# Patient Record
Sex: FEMALE | URBAN - METROPOLITAN AREA
[De-identification: names, ages, dates, MRNs, and addresses within clinical notes are randomized per-mention and may not be internally consistent; named-entity substitution may affect disease eponyms.]

---

## 2022-02-17 ENCOUNTER — OFFICE VISIT (OUTPATIENT)
Dept: URBAN - METROPOLITAN AREA CLINIC 1 | Facility: CLINIC | Age: 82
End: 2022-02-17
Payer: MEDICARE

## 2022-02-17 DIAGNOSIS — H25.13 AGE-RELATED NUCLEAR CATARACT, BILATERAL: Primary | ICD-10-CM

## 2022-02-17 DIAGNOSIS — H04.123 TEAR FILM INSUFFICIENCY OF BILATERAL LACRIMAL GLANDS: ICD-10-CM

## 2022-02-17 PROCEDURE — 92134 CPTRZ OPH DX IMG PST SGM RTA: CPT | Performed by: OPHTHALMOLOGY

## 2022-02-17 PROCEDURE — 99214 OFFICE O/P EST MOD 30 MIN: CPT | Performed by: OPHTHALMOLOGY

## 2022-02-17 ASSESSMENT — INTRAOCULAR PRESSURE
OD: 15
OS: 16

## 2022-02-17 NOTE — IMPRESSION/PLAN
Impression: Age-related nuclear cataract, bilateral: H25.13. Plan: Cataracts account for the patient's complaints. Discussed all risks, benefits, procedures and recovery. Patient understands changing glasses will not improve vision. Patient desires to have surgery, recommend phacoemulsification with intraocular lens. Today we will proceed for cataract surgery. Per pt will wait for sx until summer due to daughters work schedule. Surgery coordinator will talk to her today.

## 2022-02-24 ENCOUNTER — APPOINTMENT (RX ONLY)
Dept: URBAN - METROPOLITAN AREA CLINIC 132 | Facility: CLINIC | Age: 82
Setting detail: DERMATOLOGY
End: 2022-02-24

## 2022-02-24 DIAGNOSIS — L82.1 OTHER SEBORRHEIC KERATOSIS: ICD-10-CM

## 2022-02-24 DIAGNOSIS — L91.8 OTHER HYPERTROPHIC DISORDERS OF THE SKIN: ICD-10-CM

## 2022-02-24 DIAGNOSIS — D22 MELANOCYTIC NEVI: ICD-10-CM

## 2022-02-24 DIAGNOSIS — L30.4 ERYTHEMA INTERTRIGO: ICD-10-CM

## 2022-02-24 DIAGNOSIS — B07.8 OTHER VIRAL WARTS: ICD-10-CM

## 2022-02-24 PROBLEM — D48.5 NEOPLASM OF UNCERTAIN BEHAVIOR OF SKIN: Status: ACTIVE | Noted: 2022-02-24

## 2022-02-24 PROCEDURE — 99202 OFFICE O/P NEW SF 15 MIN: CPT | Mod: 25

## 2022-02-24 PROCEDURE — ? BIOPSY BY SHAVE METHOD

## 2022-02-24 PROCEDURE — 11102 TANGNTL BX SKIN SINGLE LES: CPT

## 2022-02-24 PROCEDURE — ? DEFER

## 2022-02-24 PROCEDURE — ? COUNSELING

## 2022-02-24 ASSESSMENT — LOCATION ZONE DERM
LOCATION ZONE: FACE
LOCATION ZONE: ARM
LOCATION ZONE: TRUNK
LOCATION ZONE: LEG
LOCATION ZONE: NECK

## 2022-02-24 ASSESSMENT — LOCATION SIMPLE DESCRIPTION DERM
LOCATION SIMPLE: RIGHT THIGH
LOCATION SIMPLE: LEFT FOREARM
LOCATION SIMPLE: LEFT ANTERIOR NECK
LOCATION SIMPLE: LEFT THIGH
LOCATION SIMPLE: ABDOMEN
LOCATION SIMPLE: LEFT FOREHEAD
LOCATION SIMPLE: RIGHT ANTERIOR NECK

## 2022-02-24 ASSESSMENT — LOCATION DETAILED DESCRIPTION DERM
LOCATION DETAILED: LEFT DISTAL RADIAL DORSAL FOREARM
LOCATION DETAILED: RIGHT ANTERIOR PROXIMAL THIGH
LOCATION DETAILED: LEFT INFERIOR LATERAL NECK
LOCATION DETAILED: LEFT FOREHEAD
LOCATION DETAILED: RIGHT INFERIOR LATERAL NECK
LOCATION DETAILED: LEFT CLAVICULAR NECK
LOCATION DETAILED: LEFT ANTERIOR PROXIMAL THIGH
LOCATION DETAILED: LEFT LATERAL ABDOMEN
LOCATION DETAILED: RIGHT LATERAL ABDOMEN

## 2022-02-24 NOTE — PROCEDURE: DEFER
Detail Level: Detailed
Introduction Text (Please End With A Colon): The following procedure was deferred: excision (plan for 6mm punch excision vs formal excision)

## 2022-02-24 NOTE — PROCEDURE: BIOPSY BY SHAVE METHOD
Biopsy Type: H and E
Size Of Lesion In Cm: 0
Hemostasis: Drysol
Billing Type: Third-Party Bill
Render Post-Care Instructions In Note?: no
Electrodesiccation Text: The wound bed was treated with electrodesiccation after the biopsy was performed.
Type Of Destruction Used: Curettage
Silver Nitrate Text: The wound bed was treated with silver nitrate after the biopsy was performed.
Wound Care: Petrolatum
Anesthesia Type: 1% lidocaine with epinephrine
Dressing: bandage
Biopsy Method: Dermablade
Post-Care Instructions: I reviewed with the patient in detail post-care instructions. Patient is to keep the biopsy site dry overnight, and then apply bacitracin twice daily until healed. Patient may apply hydrogen peroxide soaks to remove any crusting.
Detail Level: Detailed
Consent: Written consent was obtained and risks were reviewed including but not limited to scarring, infection, bleeding, scabbing, incomplete removal, nerve damage and allergy to anesthesia.
Curettage Text: The wound bed was treated with curettage after the biopsy was performed.
Anesthesia Volume In Cc: 0.5
Depth Of Biopsy: dermis
Information: Selecting Yes will display possible errors in your note based on the variables you have selected. This validation is only offered as a suggestion for you. PLEASE NOTE THAT THE VALIDATION TEXT WILL BE REMOVED WHEN YOU FINALIZE YOUR NOTE. IF YOU WANT TO FAX A PRELIMINARY NOTE YOU WILL NEED TO TOGGLE THIS TO 'NO' IF YOU DO NOT WANT IT IN YOUR FAXED NOTE.
Electrodesiccation And Curettage Text: The wound bed was treated with electrodesiccation and curettage after the biopsy was performed.
Was A Bandage Applied: Yes
Notification Instructions: Patient will be notified of biopsy results. However, patient instructed to call the office if not contacted within 2 weeks.
Cryotherapy Text: The wound bed was treated with cryotherapy after the biopsy was performed.

## 2022-09-20 ENCOUNTER — OFFICE VISIT (OUTPATIENT)
Dept: URBAN - METROPOLITAN AREA CLINIC 1 | Facility: CLINIC | Age: 82
End: 2022-09-20
Payer: MEDICARE

## 2022-09-20 DIAGNOSIS — H04.123 TEAR FILM INSUFFICIENCY OF BILATERAL LACRIMAL GLANDS: Primary | ICD-10-CM

## 2022-09-20 DIAGNOSIS — H25.13 AGE-RELATED NUCLEAR CATARACT, BILATERAL: ICD-10-CM

## 2022-09-20 PROCEDURE — 99213 OFFICE O/P EST LOW 20 MIN: CPT | Performed by: OPHTHALMOLOGY

## 2022-09-20 ASSESSMENT — INTRAOCULAR PRESSURE
OD: 13
OS: 13

## 2022-09-20 NOTE — IMPRESSION/PLAN
Impression: Age-related nuclear cataract, bilateral: H25.13. Plan: Discussed diagnosis in detail with patient.   Patient will be doing a thyroid wkup so we will hold off on surgery for now 

rv 6 mos ck cat

## 2022-09-20 NOTE — IMPRESSION/PLAN
Impression: Tear film insufficiency of bilateral lacrimal glands: H04.123.  Plan: Recommend use of high quality artificial tears 3-4x per day prn.

pt undergoing thyroid wk up

## 2023-03-23 ENCOUNTER — OFFICE VISIT (OUTPATIENT)
Dept: URBAN - METROPOLITAN AREA CLINIC 1 | Facility: CLINIC | Age: 83
End: 2023-03-23
Payer: MEDICARE

## 2023-03-23 DIAGNOSIS — H25.13 AGE-RELATED NUCLEAR CATARACT, BILATERAL: Primary | ICD-10-CM

## 2023-03-23 DIAGNOSIS — H04.123 TEAR FILM INSUFFICIENCY OF BILATERAL LACRIMAL GLANDS: ICD-10-CM

## 2023-03-23 PROCEDURE — 99213 OFFICE O/P EST LOW 20 MIN: CPT | Performed by: OPHTHALMOLOGY

## 2023-03-23 PROCEDURE — 92134 CPTRZ OPH DX IMG PST SGM RTA: CPT | Performed by: OPHTHALMOLOGY

## 2023-03-23 RX ORDER — PREDNISOLONE ACETATE 10 MG/ML
1 % SUSPENSION/ DROPS OPHTHALMIC
Qty: 5 | Refills: 1 | Status: ACTIVE
Start: 2023-03-23

## 2023-03-23 ASSESSMENT — INTRAOCULAR PRESSURE
OD: 13
OS: 13

## 2023-03-23 NOTE — IMPRESSION/PLAN
Impression: Tear film insufficiency of bilateral lacrimal glands: H04.123. Plan: Dry eyes account for the patient's complaints. There is no evidence of permanent changes to the cornea. Explained that this condition does not have a cure. The options explained to the patient include topical lubricants. Patient advised not to use eye drops for red eye or allergies. Start: 1. Artificial tears QID OU 2. Prednisolone Taper OU for 1 month 3. Warm compresses and lid massages BID OU Instructions given.

## 2023-03-23 NOTE — IMPRESSION/PLAN
Impression: Age-related nuclear cataract, bilateral: H25.13. Plan: Observe for now without intervention. The patient was advised to contact us if any change or worsening of vision. We will work on patient's dryness first before getting testing for cataracts.

## 2023-03-31 ENCOUNTER — TESTING ONLY (OUTPATIENT)
Dept: URBAN - METROPOLITAN AREA CLINIC 1 | Facility: CLINIC | Age: 83
End: 2023-03-31
Payer: MEDICARE

## 2023-03-31 DIAGNOSIS — H25.13 AGE-RELATED NUCLEAR CATARACT, BILATERAL: Primary | ICD-10-CM

## 2023-03-31 PROCEDURE — 92286 ANT SGM IMG I&R SPECLR MIC: CPT | Performed by: OPHTHALMOLOGY

## 2023-04-20 ENCOUNTER — OFFICE VISIT (OUTPATIENT)
Dept: URBAN - METROPOLITAN AREA CLINIC 1 | Facility: CLINIC | Age: 83
End: 2023-04-20
Payer: MEDICARE

## 2023-04-20 DIAGNOSIS — H25.13 AGE-RELATED NUCLEAR CATARACT, BILATERAL: Primary | ICD-10-CM

## 2023-04-20 DIAGNOSIS — H04.123 TEAR FILM INSUFFICIENCY OF BILATERAL LACRIMAL GLANDS: ICD-10-CM

## 2023-04-20 PROCEDURE — 99212 OFFICE O/P EST SF 10 MIN: CPT | Performed by: OPHTHALMOLOGY

## 2023-04-20 ASSESSMENT — INTRAOCULAR PRESSURE
OD: 15
OS: 16

## 2023-04-20 NOTE — IMPRESSION/PLAN
Impression: Tear film insufficiency of bilateral lacrimal glands: H04.123. Plan: Dry eyes account for the patient's complaints. There is no evidence of permanent changes to the cornea. Explained that this condition does not have a cure. The options explained to the patient include topical lubricants. Patient advised not to use eye drops for red eye or allergies. Patient to use OTC AT's tid to qid ou. Patient advised to use warm compresses and lid massages BID OU.

## 2023-04-20 NOTE — IMPRESSION/PLAN
Impression: Age-related nuclear cataract, bilateral: H25.13. Plan: Observe for now without intervention. The patient was advised to contact us if any change or worsening of vision. Patient would like to have surgery in June/July. She will sit down with Ata Bauer today to go over surgery dates.

## 2023-06-13 ENCOUNTER — OFFICE VISIT (OUTPATIENT)
Dept: URBAN - METROPOLITAN AREA CLINIC 1 | Facility: CLINIC | Age: 83
End: 2023-06-13
Payer: MEDICARE

## 2023-06-13 DIAGNOSIS — H04.123 TEAR FILM INSUFFICIENCY OF BILATERAL LACRIMAL GLANDS: ICD-10-CM

## 2023-06-13 DIAGNOSIS — H25.13 AGE-RELATED NUCLEAR CATARACT, BILATERAL: Primary | ICD-10-CM

## 2023-06-13 PROCEDURE — 99213 OFFICE O/P EST LOW 20 MIN: CPT | Performed by: OPHTHALMOLOGY

## 2023-06-13 ASSESSMENT — INTRAOCULAR PRESSURE
OS: 16
OD: 17

## 2023-06-13 NOTE — IMPRESSION/PLAN
Impression: Tear film insufficiency of bilateral lacrimal glands: H04.123. Plan: Dry eyes account for the patient's complaints. There is no evidence of permanent changes to the cornea. Explained that this condition does not have a cure. The options explained to the patient include topical lubricants. Patient advised not to use eye drops for red eye or allergies. Patient to use OTC AT's tid to qid ou. We will have patient start Lotemax TID OD 1 week before surgery due to dryness.

## 2023-06-13 NOTE — IMPRESSION/PLAN
Impression: Age-related nuclear cataract, bilateral: H25.13. Plan: Plan to perform cataract surgery in the right / left eye: Discussed the risks, benefits, and alternatives of cataract surgery. The patient stated a full understanding and a desire to proceed with the procedure. Biometry ordered for intraocular lens selection. Advised against driving until complete. Reviewed the increased risk of retinal detachment after cataract surgery and reviewed retinal detachment and general warnings and to contact us immediately should any of those develop. 

CHEST/LUNGS CLEARED ON TODAYS VISIT

DR Song Kaplan WENT OVER SPECIALTY LENS,  PATIENT UNDERSTOOD

OD 1ST EYE; CNWET0 +20.5; VIVITY TORIC  UPGRADE; SURGERY DROPS

## 2024-09-04 ENCOUNTER — APPOINTMENT (RX ONLY)
Dept: URBAN - METROPOLITAN AREA CLINIC 132 | Facility: CLINIC | Age: 84
Setting detail: DERMATOLOGY
End: 2024-09-04

## 2024-09-04 DIAGNOSIS — L29.89 OTHER PRURITUS: ICD-10-CM

## 2024-09-04 PROBLEM — L29.8 OTHER PRURITUS: Status: ACTIVE | Noted: 2024-09-04

## 2024-09-04 PROCEDURE — ? COUNSELING

## 2024-09-04 PROCEDURE — ? ORDER TESTS

## 2024-09-04 PROCEDURE — ? PRESCRIPTION

## 2024-09-04 PROCEDURE — 99214 OFFICE O/P EST MOD 30 MIN: CPT

## 2024-09-04 RX ORDER — HYDROXYZINE PAMOATE 25 MG/1
CAPSULE ORAL
Qty: 30 | Refills: 0 | Status: ERX | COMMUNITY
Start: 2024-09-04

## 2024-09-04 RX ADMIN — HYDROXYZINE PAMOATE: 25 CAPSULE ORAL at 00:00

## 2024-09-04 ASSESSMENT — LOCATION DETAILED DESCRIPTION DERM
LOCATION DETAILED: RIGHT PROXIMAL PRETIBIAL REGION
LOCATION DETAILED: SUPERIOR THORACIC SPINE
LOCATION DETAILED: RIGHT ANTERIOR PROXIMAL THIGH
LOCATION DETAILED: LEFT ANTERIOR PROXIMAL THIGH
LOCATION DETAILED: RIGHT BUTTOCK
LOCATION DETAILED: LEFT DISTAL PRETIBIAL REGION
LOCATION DETAILED: RIGHT PROXIMAL LATERAL POSTERIOR THIGH
LOCATION DETAILED: LEFT INGUINAL CREASE
LOCATION DETAILED: PERIUMBILICAL SKIN
LOCATION DETAILED: LEFT BUTTOCK
LOCATION DETAILED: RIGHT INFERIOR MEDIAL UPPER BACK

## 2024-09-04 ASSESSMENT — LOCATION ZONE DERM
LOCATION ZONE: LEG
LOCATION ZONE: TRUNK

## 2024-09-04 ASSESSMENT — LOCATION SIMPLE DESCRIPTION DERM
LOCATION SIMPLE: LEFT BUTTOCK
LOCATION SIMPLE: ABDOMEN
LOCATION SIMPLE: GROIN
LOCATION SIMPLE: UPPER BACK
LOCATION SIMPLE: RIGHT POSTERIOR THIGH
LOCATION SIMPLE: RIGHT BUTTOCK
LOCATION SIMPLE: RIGHT UPPER BACK
LOCATION SIMPLE: LEFT PRETIBIAL REGION
LOCATION SIMPLE: RIGHT PRETIBIAL REGION
LOCATION SIMPLE: RIGHT THIGH
LOCATION SIMPLE: LEFT THIGH

## 2024-09-04 NOTE — PROCEDURE: ORDER TESTS
Billing Type: Third-Party Bill
Expected Date Of Service: 09/04/2024
Bill For Surgical Tray: no
Performing Laboratory: [id number]

## 2024-09-18 ENCOUNTER — APPOINTMENT (RX ONLY)
Dept: URBAN - METROPOLITAN AREA CLINIC 132 | Facility: CLINIC | Age: 84
Setting detail: DERMATOLOGY
End: 2024-09-18

## 2024-09-18 DIAGNOSIS — L85.3 XEROSIS CUTIS: ICD-10-CM

## 2024-09-18 DIAGNOSIS — R20.2 PARESTHESIA OF SKIN: ICD-10-CM

## 2024-09-18 DIAGNOSIS — L29.89 OTHER PRURITUS: ICD-10-CM | Status: IMPROVED

## 2024-09-18 PROBLEM — L29.8 OTHER PRURITUS: Status: ACTIVE | Noted: 2024-09-18

## 2024-09-18 PROCEDURE — ? PRESCRIPTION

## 2024-09-18 PROCEDURE — ? COUNSELING

## 2024-09-18 PROCEDURE — 99214 OFFICE O/P EST MOD 30 MIN: CPT

## 2024-09-18 PROCEDURE — ? LAB REPORTS REVIEWED

## 2024-09-18 RX ORDER — TRIAMCINOLONE ACETONIDE 1 MG/G
CREAM TOPICAL BID
Qty: 80 | Refills: 3 | Status: ERX | COMMUNITY
Start: 2024-09-18

## 2024-09-18 RX ADMIN — TRIAMCINOLONE ACETONIDE: 1 CREAM TOPICAL at 00:00

## 2024-09-18 ASSESSMENT — LOCATION SIMPLE DESCRIPTION DERM
LOCATION SIMPLE: UPPER BACK
LOCATION SIMPLE: GROIN
LOCATION SIMPLE: RIGHT POSTERIOR THIGH
LOCATION SIMPLE: RIGHT THIGH
LOCATION SIMPLE: RIGHT PRETIBIAL REGION
LOCATION SIMPLE: RIGHT UPPER BACK
LOCATION SIMPLE: LEFT THIGH
LOCATION SIMPLE: LEFT PRETIBIAL REGION
LOCATION SIMPLE: RIGHT BUTTOCK
LOCATION SIMPLE: SCALP
LOCATION SIMPLE: LEFT FOREARM
LOCATION SIMPLE: LEFT BUTTOCK
LOCATION SIMPLE: RIGHT FOREARM
LOCATION SIMPLE: ABDOMEN

## 2024-09-18 ASSESSMENT — LOCATION DETAILED DESCRIPTION DERM
LOCATION DETAILED: RIGHT ANTERIOR PROXIMAL THIGH
LOCATION DETAILED: RIGHT INFERIOR MEDIAL UPPER BACK
LOCATION DETAILED: LEFT INGUINAL CREASE
LOCATION DETAILED: LEFT BUTTOCK
LOCATION DETAILED: LEFT SUPERIOR PARIETAL SCALP
LOCATION DETAILED: SUPERIOR THORACIC SPINE
LOCATION DETAILED: RIGHT PROXIMAL LATERAL POSTERIOR THIGH
LOCATION DETAILED: RIGHT PROXIMAL PRETIBIAL REGION
LOCATION DETAILED: PERIUMBILICAL SKIN
LOCATION DETAILED: LEFT DISTAL PRETIBIAL REGION
LOCATION DETAILED: RIGHT PROXIMAL DORSAL FOREARM
LOCATION DETAILED: RIGHT MEDIAL UPPER BACK
LOCATION DETAILED: LEFT ANTERIOR PROXIMAL THIGH
LOCATION DETAILED: RIGHT BUTTOCK
LOCATION DETAILED: LEFT DISTAL DORSAL FOREARM

## 2024-09-18 ASSESSMENT — LOCATION ZONE DERM
LOCATION ZONE: SCALP
LOCATION ZONE: LEG
LOCATION ZONE: ARM
LOCATION ZONE: TRUNK

## 2024-09-18 NOTE — HPI: FOLLOW-UP
What Is The Condition That You Are Returning For Follow-Up?: other
Additional History: On last visit was prescribed hydroxyzine which she discontinued due to making her too sleepy

## 2024-10-01 ENCOUNTER — OFFICE VISIT (OUTPATIENT)
Dept: URBAN - METROPOLITAN AREA CLINIC 1 | Facility: CLINIC | Age: 84
End: 2024-10-01
Payer: MEDICARE

## 2024-10-01 DIAGNOSIS — H04.123 TEAR FILM INSUFFICIENCY OF BILATERAL LACRIMAL GLANDS: ICD-10-CM

## 2024-10-01 PROCEDURE — 92134 CPTRZ OPH DX IMG PST SGM RTA: CPT | Performed by: OPHTHALMOLOGY

## 2024-10-01 PROCEDURE — 99214 OFFICE O/P EST MOD 30 MIN: CPT | Performed by: OPHTHALMOLOGY

## 2024-10-01 ASSESSMENT — INTRAOCULAR PRESSURE
OD: 15
OS: 15

## 2024-10-04 ENCOUNTER — TECH ONLY (OUTPATIENT)
Dept: URBAN - METROPOLITAN AREA CLINIC 1 | Facility: CLINIC | Age: 84
End: 2024-10-04
Payer: MEDICARE

## 2024-10-04 DIAGNOSIS — H25.13 AGE-RELATED NUCLEAR CATARACT, BILATERAL: Primary | ICD-10-CM

## 2024-10-04 PROCEDURE — 92025 CPTRIZED CORNEAL TOPOGRAPHY: CPT | Performed by: OPHTHALMOLOGY

## 2024-10-14 ENCOUNTER — OFFICE VISIT (OUTPATIENT)
Dept: URBAN - METROPOLITAN AREA CLINIC 1 | Facility: CLINIC | Age: 84
End: 2024-10-14
Payer: MEDICARE

## 2024-10-14 DIAGNOSIS — H25.13 AGE-RELATED NUCLEAR CATARACT, BILATERAL: Primary | ICD-10-CM

## 2024-10-14 PROCEDURE — 99214 OFFICE O/P EST MOD 30 MIN: CPT | Performed by: OPHTHALMOLOGY

## 2024-10-14 RX ORDER — CIPROFLOXACIN HYDROCHLORIDE 3 MG/ML
0.3 % SOLUTION OPHTHALMIC
Qty: 5 | Refills: 1 | Status: INACTIVE
Start: 2024-10-14 | End: 2024-10-15

## 2024-10-14 RX ORDER — PREDNISOLONE ACETATE 10 MG/ML
1 % SUSPENSION/ DROPS OPHTHALMIC
Qty: 10 | Refills: 1 | Status: ACTIVE
Start: 2024-10-14

## 2024-10-14 ASSESSMENT — INTRAOCULAR PRESSURE
OS: 18
OD: 17

## 2024-11-06 ENCOUNTER — APPOINTMENT (RX ONLY)
Dept: URBAN - METROPOLITAN AREA CLINIC 132 | Facility: CLINIC | Age: 84
Setting detail: DERMATOLOGY
End: 2024-11-06

## 2024-11-06 DIAGNOSIS — L29.89 OTHER PRURITUS: ICD-10-CM

## 2024-11-06 DIAGNOSIS — R20.2 PARESTHESIA OF SKIN: ICD-10-CM

## 2024-11-06 PROCEDURE — ? COUNSELING

## 2024-11-06 PROCEDURE — ? TREATMENT REGIMEN

## 2024-11-06 PROCEDURE — 99213 OFFICE O/P EST LOW 20 MIN: CPT

## 2024-11-06 PROCEDURE — ? PRESCRIPTION

## 2024-11-06 RX ORDER — CLOBETASOL PROPIONATE 0.5 MG/ML
SOLUTION TOPICAL
Qty: 50 | Refills: 5 | Status: ERX | COMMUNITY
Start: 2024-11-06

## 2024-11-06 RX ADMIN — CLOBETASOL PROPIONATE: 0.5 SOLUTION TOPICAL at 00:00

## 2024-11-06 ASSESSMENT — LOCATION SIMPLE DESCRIPTION DERM
LOCATION SIMPLE: LEFT THIGH
LOCATION SIMPLE: LEFT PRETIBIAL REGION
LOCATION SIMPLE: RIGHT PRETIBIAL REGION
LOCATION SIMPLE: RIGHT POSTERIOR THIGH
LOCATION SIMPLE: RIGHT THIGH
LOCATION SIMPLE: RIGHT BUTTOCK
LOCATION SIMPLE: ABDOMEN
LOCATION SIMPLE: UPPER BACK
LOCATION SIMPLE: LEFT BUTTOCK
LOCATION SIMPLE: RIGHT UPPER BACK
LOCATION SIMPLE: GROIN

## 2024-11-06 ASSESSMENT — LOCATION DETAILED DESCRIPTION DERM
LOCATION DETAILED: RIGHT PROXIMAL LATERAL POSTERIOR THIGH
LOCATION DETAILED: PERIUMBILICAL SKIN
LOCATION DETAILED: RIGHT ANTERIOR PROXIMAL THIGH
LOCATION DETAILED: LEFT DISTAL PRETIBIAL REGION
LOCATION DETAILED: LEFT BUTTOCK
LOCATION DETAILED: RIGHT MEDIAL UPPER BACK
LOCATION DETAILED: RIGHT INFERIOR MEDIAL UPPER BACK
LOCATION DETAILED: RIGHT BUTTOCK
LOCATION DETAILED: LEFT INGUINAL CREASE
LOCATION DETAILED: RIGHT PROXIMAL PRETIBIAL REGION
LOCATION DETAILED: SUPERIOR THORACIC SPINE
LOCATION DETAILED: LEFT ANTERIOR PROXIMAL THIGH

## 2024-11-06 ASSESSMENT — LOCATION ZONE DERM
LOCATION ZONE: TRUNK
LOCATION ZONE: LEG

## 2025-02-05 ENCOUNTER — APPOINTMENT (OUTPATIENT)
Dept: URBAN - METROPOLITAN AREA CLINIC 132 | Facility: CLINIC | Age: 85
Setting detail: DERMATOLOGY
End: 2025-02-05

## 2025-02-05 DIAGNOSIS — L29.89 OTHER PRURITUS: ICD-10-CM | Status: WELL CONTROLLED

## 2025-02-05 DIAGNOSIS — R20.2 PARESTHESIA OF SKIN: ICD-10-CM

## 2025-02-05 PROCEDURE — ? COUNSELING

## 2025-02-05 PROCEDURE — 99213 OFFICE O/P EST LOW 20 MIN: CPT

## 2025-02-05 PROCEDURE — ? TREATMENT REGIMEN

## 2025-02-05 PROCEDURE — ? PRESCRIPTION

## 2025-02-05 RX ORDER — TRIAMCINOLONE ACETONIDE 1 MG/G
CREAM TOPICAL BID
Qty: 453.6 | Refills: 3 | Status: ERX

## 2025-02-05 ASSESSMENT — LOCATION DETAILED DESCRIPTION DERM
LOCATION DETAILED: RIGHT MEDIAL UPPER BACK
LOCATION DETAILED: PERIUMBILICAL SKIN
LOCATION DETAILED: LEFT INGUINAL CREASE
LOCATION DETAILED: LEFT BUTTOCK
LOCATION DETAILED: SUPERIOR THORACIC SPINE
LOCATION DETAILED: LEFT ANTERIOR PROXIMAL THIGH
LOCATION DETAILED: LEFT DISTAL PRETIBIAL REGION
LOCATION DETAILED: RIGHT ANTERIOR PROXIMAL THIGH
LOCATION DETAILED: RIGHT INFERIOR MEDIAL UPPER BACK
LOCATION DETAILED: RIGHT PROXIMAL PRETIBIAL REGION
LOCATION DETAILED: RIGHT BUTTOCK
LOCATION DETAILED: RIGHT PROXIMAL LATERAL POSTERIOR THIGH

## 2025-02-05 ASSESSMENT — LOCATION SIMPLE DESCRIPTION DERM
LOCATION SIMPLE: UPPER BACK
LOCATION SIMPLE: RIGHT POSTERIOR THIGH
LOCATION SIMPLE: GROIN
LOCATION SIMPLE: LEFT THIGH
LOCATION SIMPLE: LEFT PRETIBIAL REGION
LOCATION SIMPLE: ABDOMEN
LOCATION SIMPLE: RIGHT BUTTOCK
LOCATION SIMPLE: LEFT BUTTOCK
LOCATION SIMPLE: RIGHT UPPER BACK
LOCATION SIMPLE: RIGHT THIGH
LOCATION SIMPLE: RIGHT PRETIBIAL REGION

## 2025-02-05 ASSESSMENT — LOCATION ZONE DERM
LOCATION ZONE: LEG
LOCATION ZONE: TRUNK